# Patient Record
Sex: FEMALE | Race: WHITE | Employment: UNEMPLOYED
[De-identification: names, ages, dates, MRNs, and addresses within clinical notes are randomized per-mention and may not be internally consistent; named-entity substitution may affect disease eponyms.]

---

## 2024-06-06 ENCOUNTER — TELEPHONE (OUTPATIENT)
Facility: HOSPITAL | Age: 79
End: 2024-06-06

## 2024-06-06 NOTE — TELEPHONE ENCOUNTER
Called patient to confirm appointment for bone marrow biopsy 6/7/24 at 1000 (arrive at 0900). Left voicemail with all pre-procedure instructions.

## 2024-06-07 ENCOUNTER — HOSPITAL ENCOUNTER (OUTPATIENT)
Facility: HOSPITAL | Age: 79
End: 2024-06-07
Payer: MEDICARE

## 2024-06-07 VITALS
HEART RATE: 75 BPM | HEIGHT: 65 IN | BODY MASS INDEX: 22.49 KG/M2 | TEMPERATURE: 98.2 F | OXYGEN SATURATION: 93 % | RESPIRATION RATE: 20 BRPM | WEIGHT: 135 LBS | SYSTOLIC BLOOD PRESSURE: 147 MMHG | DIASTOLIC BLOOD PRESSURE: 73 MMHG

## 2024-06-07 DIAGNOSIS — D72.829 LEUKOCYTOSIS, UNSPECIFIED TYPE: ICD-10-CM

## 2024-06-07 LAB
ANION GAP SERPL CALC-SCNC: 6 MMOL/L (ref 3–18)
APTT PPP: 42.5 SEC (ref 23–36.4)
BASOPHILS # BLD: 0.5 K/UL (ref 0–0.1)
BASOPHILS NFR BLD: 1 % (ref 0–2)
BLASTS NFR BLD MANUAL: 1 %
BUN SERPL-MCNC: 17 MG/DL (ref 7–18)
BUN/CREAT SERPL: 34 (ref 12–20)
CALCIUM SERPL-MCNC: 8.9 MG/DL (ref 8.5–10.1)
CHLORIDE SERPL-SCNC: 105 MMOL/L (ref 100–111)
CO2 SERPL-SCNC: 27 MMOL/L (ref 21–32)
CREAT SERPL-MCNC: 0.5 MG/DL (ref 0.6–1.3)
DIFFERENTIAL METHOD BLD: ABNORMAL
EOSINOPHIL # BLD: 1 K/UL (ref 0–0.4)
EOSINOPHIL NFR BLD: 2 % (ref 0–5)
ERYTHROCYTE [DISTWIDTH] IN BLOOD BY AUTOMATED COUNT: 27.5 % (ref 11.6–14.5)
GLUCOSE SERPL-MCNC: 139 MG/DL (ref 74–99)
HCT VFR BLD AUTO: 48.7 % (ref 35–45)
HGB BLD-MCNC: 15.3 G/DL (ref 12–16)
IMM GRANULOCYTES # BLD AUTO: 0 K/UL (ref 0–0.04)
IMM GRANULOCYTES NFR BLD AUTO: 0 % (ref 0–0.5)
INR PPP: 1.5 (ref 0.9–1.1)
LYMPHOCYTES # BLD: 4.2 K/UL (ref 0.9–3.6)
LYMPHOCYTES NFR BLD: 8 % (ref 21–52)
MCH RBC QN AUTO: 23.8 PG (ref 24–34)
MCHC RBC AUTO-ENTMCNC: 31.4 G/DL (ref 31–37)
MCV RBC AUTO: 75.6 FL (ref 78–100)
METAMYELOCYTES NFR BLD MANUAL: 4 %
MONOCYTES # BLD: 4.2 K/UL (ref 0.05–1.2)
MONOCYTES NFR BLD: 8 % (ref 3–10)
MYELOCYTES NFR BLD MANUAL: 3 %
NEUTS BAND NFR BLD MANUAL: 4 %
NEUTS SEG # BLD: 37.1 K/UL (ref 1.8–8)
NEUTS SEG NFR BLD: 67 % (ref 40–73)
NRBC # BLD: 0.6 K/UL (ref 0–0.01)
NRBC BLD-RTO: 1.1 PER 100 WBC
OTHER CELLS NFR BLD MANUAL: 1
PLATELET # BLD AUTO: 122 K/UL (ref 135–420)
PLATELET COMMENT: ABNORMAL
POTASSIUM SERPL-SCNC: 3.7 MMOL/L (ref 3.5–5.5)
PROMYELOCYTES NFR BLD MANUAL: 1 %
PROTHROMBIN TIME: 18.1 SEC (ref 11.9–14.9)
RBC # BLD AUTO: 6.44 M/UL (ref 4.2–5.3)
RBC MORPH BLD: ABNORMAL
SODIUM SERPL-SCNC: 138 MMOL/L (ref 136–145)
TOTAL CELLS COUNTED SPEC: 200
WBC # BLD AUTO: 52.2 K/UL (ref 4.6–13.2)

## 2024-06-07 PROCEDURE — 85610 PROTHROMBIN TIME: CPT

## 2024-06-07 PROCEDURE — 88305 TISSUE EXAM BY PATHOLOGIST: CPT

## 2024-06-07 PROCEDURE — 85730 THROMBOPLASTIN TIME PARTIAL: CPT

## 2024-06-07 PROCEDURE — 2500000003 HC RX 250 WO HCPCS: Performed by: RADIOLOGY

## 2024-06-07 PROCEDURE — 85025 COMPLETE CBC W/AUTO DIFF WBC: CPT

## 2024-06-07 PROCEDURE — 6360000002 HC RX W HCPCS: Performed by: RADIOLOGY

## 2024-06-07 PROCEDURE — 88313 SPECIAL STAINS GROUP 2: CPT

## 2024-06-07 PROCEDURE — 88311 DECALCIFY TISSUE: CPT

## 2024-06-07 PROCEDURE — 80048 BASIC METABOLIC PNL TOTAL CA: CPT

## 2024-06-07 PROCEDURE — 99157 MOD SED OTHER PHYS/QHP EA: CPT

## 2024-06-07 RX ORDER — LIDOCAINE HYDROCHLORIDE 10 MG/ML
INJECTION, SOLUTION EPIDURAL; INFILTRATION; INTRACAUDAL; PERINEURAL PRN
Status: COMPLETED | OUTPATIENT
Start: 2024-06-07 | End: 2024-06-07

## 2024-06-07 RX ORDER — SODIUM CHLORIDE 9 MG/ML
INJECTION, SOLUTION INTRAVENOUS CONTINUOUS
Status: DISCONTINUED | OUTPATIENT
Start: 2024-06-07 | End: 2024-06-11 | Stop reason: HOSPADM

## 2024-06-07 RX ORDER — MIDAZOLAM HYDROCHLORIDE 2 MG/2ML
INJECTION, SOLUTION INTRAMUSCULAR; INTRAVENOUS PRN
Status: COMPLETED | OUTPATIENT
Start: 2024-06-07 | End: 2024-06-07

## 2024-06-07 RX ORDER — ONDANSETRON 4 MG/1
4 TABLET, FILM COATED ORAL EVERY 8 HOURS PRN
COMMUNITY

## 2024-06-07 RX ORDER — LISINOPRIL 20 MG/1
20 TABLET ORAL DAILY
COMMUNITY

## 2024-06-07 RX ORDER — FENTANYL CITRATE 50 UG/ML
INJECTION, SOLUTION INTRAMUSCULAR; INTRAVENOUS PRN
Status: COMPLETED | OUTPATIENT
Start: 2024-06-07 | End: 2024-06-07

## 2024-06-07 RX ADMIN — FENTANYL CITRATE 50 MCG: 50 INJECTION INTRAMUSCULAR; INTRAVENOUS at 12:23

## 2024-06-07 RX ADMIN — MIDAZOLAM HYDROCHLORIDE 1 MG: 1 INJECTION, SOLUTION INTRAMUSCULAR; INTRAVENOUS at 12:23

## 2024-06-07 RX ADMIN — MIDAZOLAM HYDROCHLORIDE 0.5 MG: 1 INJECTION, SOLUTION INTRAMUSCULAR; INTRAVENOUS at 12:29

## 2024-06-07 RX ADMIN — FENTANYL CITRATE 25 MCG: 50 INJECTION INTRAMUSCULAR; INTRAVENOUS at 12:28

## 2024-06-07 RX ADMIN — MIDAZOLAM HYDROCHLORIDE 1 MG: 1 INJECTION, SOLUTION INTRAMUSCULAR; INTRAVENOUS at 12:14

## 2024-06-07 RX ADMIN — MIDAZOLAM HYDROCHLORIDE 0.5 MG: 1 INJECTION, SOLUTION INTRAMUSCULAR; INTRAVENOUS at 12:28

## 2024-06-07 RX ADMIN — FENTANYL CITRATE 25 MCG: 50 INJECTION INTRAMUSCULAR; INTRAVENOUS at 12:29

## 2024-06-07 RX ADMIN — LIDOCAINE HYDROCHLORIDE 10 ML: 10 INJECTION, SOLUTION EPIDURAL; INFILTRATION; INTRACAUDAL; PERINEURAL at 12:36

## 2024-06-07 RX ADMIN — FENTANYL CITRATE 50 MCG: 50 INJECTION INTRAMUSCULAR; INTRAVENOUS at 12:14

## 2024-06-07 NOTE — PROGRESS NOTES
TRANSFER - OUT REPORT:    Verbal report given to RAGINI, RN (name) on Raquel Martin being transferred to cath holding (unit) for routine post-op       Report consisted of patient's Situation, Background, Assessment and   Recommendations(SBAR).     Information from the following report(s) Nurse Handoff Report was reviewed with the receiving nurse.    Opportunity for questions and clarification was provided.      Patient transported with:   Registered Nurse

## 2024-06-07 NOTE — PROGRESS NOTES
AVS Discharge instructions reviewed with patient and copy given to patient.  All questions answered.  Patient verbalized understanding to all discharge instructions.  PIV removed. Procedural site within normal limits.  No hematoma or bleeding noted from procedural and PIV site. No pain noted at discharge. Patient back to neurological baseline, alert and oriented times 4. Patient discharged in the presence of a responsible adult (yes) who will accompany patient home and is able to report post procedure complications.

## 2024-06-07 NOTE — H&P
History and Physical    Patient: Raquel Martin           Sex: female       DOA: 6/7/2024  YOB: 1945      Age:  78 y.o.     LOS:  LOS: 0 days        HPI:     Raquel Martin is a 78 y.o. female with leukocytosis presents for bone marrow aspiration and biopsy with moderate sedation     No past medical history on file.    No past surgical history on file.    No family history on file.    Social History     Socioeconomic History    Marital status:        Prior to Admission medications    Medication Sig Start Date End Date Taking? Authorizing Provider   lisinopril (PRINIVIL;ZESTRIL) 20 MG tablet Take 1 tablet by mouth daily   Yes ProviderJaime MD   metFORMIN (GLUCOPHAGE) 500 MG tablet Take 1 tablet by mouth 2 times daily (with meals)   Yes ProviderJaime MD   ondansetron (ZOFRAN) 4 MG tablet Take 1 tablet by mouth every 8 hours as needed for Nausea or Vomiting   Yes Provider, MD Jaime     No Known Allergies    Physical Exam:      Visit Vitals  Ht 1.651 m (5' 5\")   Wt 61.2 kg (135 lb)   Breastfeeding No   BMI 22.47 kg/m²     Physical Exam:  Mallampati 3 ASA 3  General: A&O x 4, NAD  Heart: RRR  Lungs: Normal work of breathing on room air    Labs Reviewed:  All lab results for the last 24 hours reviewed    Sedation plan:   Moderate sedation with intravenous fentanyl and Versed    Assessment/Plan     The patient is an appropriate candidate to undergo the planned procedure and sedation    VASILIY Cabrera

## 2024-06-07 NOTE — DISCHARGE INSTRUCTIONS
BONE MARROW BIOPSY DISCHARGE INSTRUCTIONS    Medications:    Resume regular medications, EXCEPT aspirin, ibuprofen, NSAIDS and /or anti platelets or blood thinners for 24 hours  You may take Tylenol (acetaminophen) for pain or discomfort if OK with your physician  Please contact your prescribing physician for instructions if you are taking Coumadin      Activities:    Take it easy for the rest of the day  No heavy lifting (avoid objects greater than 10 lbs.) or strenuous activity for the next 48 hours  Remove the band aid from biopsy site 24 hours after your procedure  You may shower 24 hours after your procedure  Do not swim, take a bath, hot tub, or use a whirlpool for 7-10 days after or your biopsy or until your biopsy site has healed.    Diet:    Resume your diet as tolerated  Drink plenty of fluids (unless your doctor tells you not to).      Notify your physician if you notice:    Bleeding  Excessive pain  It is common to have mild pain or discomfort at the biopsy site, shoulder, arm and neck for 1-2 days  Excessive swelling  Excessive bruising   Foul odor or drainage at the procedure site  Fever greater than 100 °F      Thank you for enrolling in [email or web address]. Please follow the instructions below to securely access your online medical record. [email or web address] allows you to send messages to your doctor, view your test results, renew your prescriptions, schedule appointments, and more.     How Do I Sign Up?  In your Internet browser, go to [email or web address]  Click on the Sign Up Now link in the Sign In box. You will see the New Member Sign Up page.  Enter your [email or web address] Access Code exactly as it appears below. You will not need to use this code after you’ve completed the sign-up process. If you do not sign up before the expiration date, you must request a new code.  [email or web address] Access Code: [unfilled]    Enter your Social Security Number ([email or web address]) and Date of Birth (mm/dd/yyyy) as